# Patient Record
Sex: MALE | URBAN - METROPOLITAN AREA
[De-identification: names, ages, dates, MRNs, and addresses within clinical notes are randomized per-mention and may not be internally consistent; named-entity substitution may affect disease eponyms.]

---

## 2021-12-17 ENCOUNTER — OFFICE VISIT (OUTPATIENT)
Dept: URBAN - METROPOLITAN AREA CLINIC 46 | Facility: CLINIC | Age: 26
End: 2021-12-17

## 2021-12-17 DIAGNOSIS — Z96.1 PRESENCE OF INTRAOCULAR LENS: ICD-10-CM

## 2021-12-17 DIAGNOSIS — H18.421 BAND KERATOPATHY, RIGHT EYE: ICD-10-CM

## 2021-12-17 DIAGNOSIS — H25.11 AGE-RELATED NUCLEAR CATARACT, RIGHT EYE: ICD-10-CM

## 2021-12-17 DIAGNOSIS — H40.1133 PRIMARY OPEN-ANGLE GLAUCOMA, SEVERE STAGE, BILATERAL: Primary | ICD-10-CM

## 2021-12-17 PROCEDURE — 99213 OFFICE O/P EST LOW 20 MIN: CPT | Performed by: OPHTHALMOLOGY

## 2021-12-17 ASSESSMENT — INTRAOCULAR PRESSURE
OS: 19
OD: 8

## 2021-12-17 NOTE — IMPRESSION/PLAN
Impression: Primary open-angle glaucoma, severe stage, bilateral: H40.1133. Plan: Patient's intraocular pressure is within acceptable range and examination is unchanged. Continue current treatment krytan and Lumigan 3-4 daily left eye only.

## 2021-12-17 NOTE — IMPRESSION/PLAN
Impression: Age-related nuclear cataract, right eye: H25.11. Plan: Patient advised there is a cataract, but that visual functioning is good, and cataract surgery is not required at this time. Further advised there is no specific urgency for cataract surgery. They were also advised that having cataract surgery does not mean they will not need to use glasses or contact lenses. We will continue to monitor and they are advised to call or return if any new symptoms.

## 2022-06-15 ENCOUNTER — OFFICE VISIT (OUTPATIENT)
Dept: URBAN - METROPOLITAN AREA CLINIC 50 | Facility: CLINIC | Age: 27
End: 2022-06-15

## 2022-06-15 DIAGNOSIS — H18.421 BAND KERATOPATHY, RIGHT EYE: ICD-10-CM

## 2022-06-15 DIAGNOSIS — H25.11 AGE-RELATED NUCLEAR CATARACT, RIGHT EYE: ICD-10-CM

## 2022-06-15 DIAGNOSIS — H40.1133 PRIMARY OPEN-ANGLE GLAUCOMA, SEVERE STAGE, BILATERAL: Primary | ICD-10-CM

## 2022-06-15 DIAGNOSIS — Q15.0 CONGENITAL GLAUCOMA: ICD-10-CM

## 2022-06-15 PROCEDURE — 99212 OFFICE O/P EST SF 10 MIN: CPT | Performed by: OPHTHALMOLOGY

## 2022-06-15 ASSESSMENT — INTRAOCULAR PRESSURE
OS: 16
OD: 10

## 2022-09-21 ENCOUNTER — OFFICE VISIT (OUTPATIENT)
Dept: URBAN - METROPOLITAN AREA CLINIC 50 | Facility: CLINIC | Age: 27
End: 2022-09-21

## 2022-09-21 DIAGNOSIS — H40.1133 PRIMARY OPEN-ANGLE GLAUCOMA, SEVERE STAGE, BILATERAL: Primary | ICD-10-CM

## 2022-09-21 DIAGNOSIS — H18.421 BAND KERATOPATHY, RIGHT EYE: ICD-10-CM

## 2022-09-21 PROCEDURE — 99212 OFFICE O/P EST SF 10 MIN: CPT | Performed by: OPHTHALMOLOGY

## 2022-09-21 ASSESSMENT — INTRAOCULAR PRESSURE
OS: 9
OS: 21
OS: 17
OD: 4
OD: 6

## 2022-09-21 NOTE — IMPRESSION/PLAN
Impression: Primary open-angle glaucoma, severe stage, bilateral: H40.1133. Plan: Patient's intraocular pressure is within acceptable range and examination is unchanged. Continue current treatment krytan and Lumigan twice daily left eye only, also continue Diamox 250mg BID PO.

## 2022-12-01 ENCOUNTER — OFFICE VISIT (OUTPATIENT)
Dept: URBAN - METROPOLITAN AREA CLINIC 46 | Facility: CLINIC | Age: 27
End: 2022-12-01

## 2022-12-01 DIAGNOSIS — H40.1133 PRIMARY OPEN-ANGLE GLAUCOMA, SEVERE STAGE, BILATERAL: Primary | ICD-10-CM

## 2022-12-01 DIAGNOSIS — Z96.1 PRESENCE OF INTRAOCULAR LENS: ICD-10-CM

## 2022-12-01 DIAGNOSIS — H18.421 BAND KERATOPATHY, RIGHT EYE: ICD-10-CM

## 2022-12-01 PROCEDURE — 92133 CPTRZD OPH DX IMG PST SGM ON: CPT | Performed by: OPHTHALMOLOGY

## 2022-12-01 PROCEDURE — 99213 OFFICE O/P EST LOW 20 MIN: CPT | Performed by: OPHTHALMOLOGY

## 2022-12-01 PROCEDURE — 92083 EXTENDED VISUAL FIELD XM: CPT | Performed by: OPHTHALMOLOGY

## 2022-12-01 ASSESSMENT — INTRAOCULAR PRESSURE
OS: 18
OS: 24
OD: 8
OD: 3

## 2022-12-01 NOTE — IMPRESSION/PLAN
Impression: Primary open-angle glaucoma, severe stage, bilateral: H40.1133. Plan: Patient's intraocular pressure is within acceptable range and examination is unchanged. Continue current treatment krytan and Lumigan twice daily left eye Change Diamox 250mg PO three times a day. 

Begin rhopressa

## 2023-02-20 ENCOUNTER — OFFICE VISIT (OUTPATIENT)
Dept: URBAN - METROPOLITAN AREA CLINIC 50 | Facility: CLINIC | Age: 28
End: 2023-02-20

## 2023-02-20 DIAGNOSIS — H18.421 BAND KERATOPATHY, RIGHT EYE: Primary | ICD-10-CM

## 2023-02-20 DIAGNOSIS — H40.1133 PRIMARY OPEN-ANGLE GLAUCOMA, SEVERE STAGE, BILATERAL: ICD-10-CM

## 2023-02-20 PROCEDURE — 99212 OFFICE O/P EST SF 10 MIN: CPT | Performed by: OPHTHALMOLOGY

## 2023-02-20 ASSESSMENT — INTRAOCULAR PRESSURE
OD: 8
OS: 20
OS: 23

## 2023-02-20 NOTE — IMPRESSION/PLAN
Impression: Primary open-angle glaucoma, severe stage, bilateral: H40.1133. Plan: Patient's intraocular pressure is within acceptable range and examination is unchanged. Continue current treatment krytan and Lumigan twice daily left eye Continue Diamox 250mg PO three times a day. Contiue  rhopressa qhs OS.

## 2023-03-20 ENCOUNTER — OFFICE VISIT (OUTPATIENT)
Dept: URBAN - METROPOLITAN AREA CLINIC 50 | Facility: CLINIC | Age: 28
End: 2023-03-20

## 2023-03-20 DIAGNOSIS — H40.1133 PRIMARY OPEN-ANGLE GLAUCOMA, SEVERE STAGE, BILATERAL: Primary | ICD-10-CM

## 2023-03-20 PROCEDURE — 99213 OFFICE O/P EST LOW 20 MIN: CPT | Performed by: OPHTHALMOLOGY

## 2023-03-20 RX ORDER — BIMATOPROST 0.3 MG/ML
0.03 % SOLUTION/ DROPS OPHTHALMIC
Qty: 2.5 | Refills: 11 | Status: ACTIVE
Start: 2023-03-20

## 2023-03-20 ASSESSMENT — INTRAOCULAR PRESSURE
OS: 18
OD: 10
OD: 12

## 2023-03-20 NOTE — IMPRESSION/PLAN
Impression: Primary open-angle glaucoma, severe stage, bilateral: H40.4943. Plan: Patient's intraocular pressure is within acceptable range and examination is unchanged. Continue current treatment krytan and Lumigan(may use generic twice daily left eye. Continue Diamox 250mg PO three times a day. Contiue  rhopressa qhs OS.

## 2023-05-22 ENCOUNTER — OFFICE VISIT (OUTPATIENT)
Dept: URBAN - METROPOLITAN AREA CLINIC 50 | Facility: CLINIC | Age: 28
End: 2023-05-22

## 2023-05-22 DIAGNOSIS — Z96.1 PRESENCE OF INTRAOCULAR LENS: ICD-10-CM

## 2023-05-22 DIAGNOSIS — H18.421 BAND KERATOPATHY, RIGHT EYE: ICD-10-CM

## 2023-05-22 DIAGNOSIS — H40.1133 PRIMARY OPEN-ANGLE GLAUCOMA, SEVERE STAGE, BILATERAL: Primary | ICD-10-CM

## 2023-05-22 PROCEDURE — 99213 OFFICE O/P EST LOW 20 MIN: CPT | Performed by: OPHTHALMOLOGY

## 2023-05-22 RX ORDER — BIMATOPROST 0.3 MG/ML
0.03 % SOLUTION/ DROPS OPHTHALMIC
Qty: 7.5 | Refills: 11 | Status: ACTIVE
Start: 2023-05-22

## 2023-05-22 RX ORDER — ACETAZOLAMIDE 250 MG/1
250 MG TABLET ORAL
Qty: 180 | Refills: 6 | Status: ACTIVE
Start: 2023-05-22

## 2023-05-22 RX ORDER — ACETAZOLAMIDE 250 MG/1
250 MG TABLET ORAL
Qty: 180 | Refills: 6 | Status: INACTIVE
Start: 2023-05-22 | End: 2023-05-22

## 2023-05-22 ASSESSMENT — INTRAOCULAR PRESSURE
OS: 13
OD: 10

## 2023-05-22 NOTE — IMPRESSION/PLAN
Impression: Primary open-angle glaucoma, severe stage, bilateral: H40.1133. Plan: Patient's intraocular pressure is within acceptable range and examination is unchanged. Continue current treatment krytan and Lumigan(may use generic twice daily left eye. Continue Diamox 250mg PO three times a day. 

may d/c rhopressa

## 2023-08-28 ENCOUNTER — OFFICE VISIT (OUTPATIENT)
Dept: URBAN - METROPOLITAN AREA CLINIC 50 | Facility: CLINIC | Age: 28
End: 2023-08-28
Payer: COMMERCIAL

## 2023-08-28 DIAGNOSIS — H43.392 OTHER VITREOUS OPACITIES, LEFT EYE: ICD-10-CM

## 2023-08-28 DIAGNOSIS — H43.12 VITREOUS HEMORRHAGE, LEFT EYE: Primary | ICD-10-CM

## 2023-08-28 DIAGNOSIS — H40.1133 PRIMARY OPEN-ANGLE GLAUCOMA, SEVERE STAGE, BILATERAL: ICD-10-CM

## 2023-08-28 PROCEDURE — 99214 OFFICE O/P EST MOD 30 MIN: CPT | Performed by: OPHTHALMOLOGY

## 2023-08-28 ASSESSMENT — INTRAOCULAR PRESSURE
OS: 22
OD: 12

## 2023-08-31 ENCOUNTER — OFFICE VISIT (OUTPATIENT)
Dept: URBAN - METROPOLITAN AREA CLINIC 7 | Facility: CLINIC | Age: 28
End: 2023-08-31

## 2023-08-31 DIAGNOSIS — Z96.1 PRESENCE OF INTRAOCULAR LENS: ICD-10-CM

## 2023-08-31 PROCEDURE — 76512 OPH US DX B-SCAN: CPT | Performed by: STUDENT IN AN ORGANIZED HEALTH CARE EDUCATION/TRAINING PROGRAM

## 2023-08-31 PROCEDURE — 99203 OFFICE O/P NEW LOW 30 MIN: CPT | Performed by: STUDENT IN AN ORGANIZED HEALTH CARE EDUCATION/TRAINING PROGRAM

## 2023-08-31 PROCEDURE — 92134 CPTRZ OPH DX IMG PST SGM RTA: CPT | Performed by: STUDENT IN AN ORGANIZED HEALTH CARE EDUCATION/TRAINING PROGRAM

## 2023-08-31 ASSESSMENT — INTRAOCULAR PRESSURE
OS: 33
OD: 15

## 2023-09-11 ENCOUNTER — OFFICE VISIT (OUTPATIENT)
Dept: URBAN - METROPOLITAN AREA CLINIC 50 | Facility: CLINIC | Age: 28
End: 2023-09-11

## 2023-09-11 DIAGNOSIS — H43.392 OTHER VITREOUS OPACITIES, LEFT EYE: ICD-10-CM

## 2023-09-11 DIAGNOSIS — H40.1133 PRIMARY OPEN-ANGLE GLAUCOMA, SEVERE STAGE, BILATERAL: Primary | ICD-10-CM

## 2023-09-11 PROCEDURE — 99212 OFFICE O/P EST SF 10 MIN: CPT | Performed by: OPHTHALMOLOGY

## 2023-09-11 RX ORDER — ACETAZOLAMIDE 250 MG/1
250 MG TABLET ORAL
Qty: 180 | Refills: 6 | Status: ACTIVE
Start: 2023-09-11

## 2023-09-11 RX ORDER — DORZOLAMIDE HYDROCHLORIDE AND TIMOLOL MALEATE 20; 5 MG/ML; MG/ML
SOLUTION/ DROPS OPHTHALMIC
Qty: 0 | Refills: 0 | Status: ACTIVE
Start: 2023-09-11

## 2023-09-11 ASSESSMENT — INTRAOCULAR PRESSURE
OS: 21
OS: 28
OD: 9

## 2023-09-13 ENCOUNTER — OFFICE VISIT (OUTPATIENT)
Dept: URBAN - METROPOLITAN AREA CLINIC 41 | Facility: CLINIC | Age: 28
End: 2023-09-13

## 2023-09-13 DIAGNOSIS — Z96.1 PRESENCE OF INTRAOCULAR LENS: ICD-10-CM

## 2023-09-13 DIAGNOSIS — H43.12 VITREOUS HEMORRHAGE, LEFT EYE: ICD-10-CM

## 2023-09-13 PROCEDURE — 92201 OPSCPY EXTND RTA DRAW UNI/BI: CPT | Performed by: STUDENT IN AN ORGANIZED HEALTH CARE EDUCATION/TRAINING PROGRAM

## 2023-09-13 PROCEDURE — 99214 OFFICE O/P EST MOD 30 MIN: CPT | Performed by: STUDENT IN AN ORGANIZED HEALTH CARE EDUCATION/TRAINING PROGRAM

## 2023-09-13 PROCEDURE — 92134 CPTRZ OPH DX IMG PST SGM RTA: CPT | Performed by: STUDENT IN AN ORGANIZED HEALTH CARE EDUCATION/TRAINING PROGRAM

## 2023-09-13 ASSESSMENT — INTRAOCULAR PRESSURE
OS: 25
OD: 7

## 2023-10-16 ENCOUNTER — OFFICE VISIT (OUTPATIENT)
Dept: URBAN - METROPOLITAN AREA CLINIC 50 | Facility: CLINIC | Age: 28
End: 2023-10-16

## 2023-10-16 DIAGNOSIS — H40.1133 PRIMARY OPEN-ANGLE GLAUCOMA, SEVERE STAGE, BILATERAL: Primary | ICD-10-CM

## 2023-10-16 DIAGNOSIS — H43.12 VITREOUS HEMORRHAGE, LEFT EYE: ICD-10-CM

## 2023-10-16 PROCEDURE — 99213 OFFICE O/P EST LOW 20 MIN: CPT | Performed by: OPHTHALMOLOGY

## 2023-10-16 RX ORDER — PREDNISOLONE ACETATE 10 MG/ML
1 % SUSPENSION/ DROPS OPHTHALMIC
Qty: 15 | Refills: 0 | Status: ACTIVE
Start: 2023-10-16

## 2023-10-16 ASSESSMENT — INTRAOCULAR PRESSURE
OS: 23
OD: 9
OS: 29
OS: 22

## 2023-10-26 ENCOUNTER — PROCEDURE (OUTPATIENT)
Dept: URBAN - METROPOLITAN AREA SURGERY 25 | Facility: SURGERY | Age: 28
End: 2023-10-26

## 2023-10-26 PROCEDURE — 66710 CILIARY TRANSSLERAL THERAPY: CPT | Performed by: OPHTHALMOLOGY

## 2023-10-27 ENCOUNTER — POST-OPERATIVE VISIT (OUTPATIENT)
Dept: URBAN - METROPOLITAN AREA CLINIC 46 | Facility: CLINIC | Age: 28
End: 2023-10-27

## 2023-10-27 DIAGNOSIS — Z48.810 ENCOUNTER FOR SURGICAL AFTERCARE FOLLOWING SURGERY ON A SENSE ORGAN: Primary | ICD-10-CM

## 2023-10-27 PROCEDURE — 99024 POSTOP FOLLOW-UP VISIT: CPT | Performed by: OPHTHALMOLOGY

## 2023-10-27 ASSESSMENT — INTRAOCULAR PRESSURE
OS: 45
OS: 27
OS: 42

## 2023-11-27 ENCOUNTER — OFFICE VISIT (OUTPATIENT)
Dept: URBAN - METROPOLITAN AREA CLINIC 50 | Facility: CLINIC | Age: 28
End: 2023-11-27

## 2023-11-27 DIAGNOSIS — H40.1133 PRIMARY OPEN-ANGLE GLAUCOMA, SEVERE STAGE, BILATERAL: Primary | ICD-10-CM

## 2023-11-27 DIAGNOSIS — H43.12 VITREOUS HEMORRHAGE, LEFT EYE: ICD-10-CM

## 2023-11-27 PROCEDURE — 99212 OFFICE O/P EST SF 10 MIN: CPT | Performed by: OPHTHALMOLOGY

## 2023-11-27 RX ORDER — ACETAZOLAMIDE 250 MG/1
250 MG TABLET ORAL
Qty: 180 | Refills: 6 | Status: ACTIVE
Start: 2023-11-27

## 2023-11-27 ASSESSMENT — INTRAOCULAR PRESSURE
OS: 12
OD: 9
OS: 10

## 2024-03-04 ENCOUNTER — OFFICE VISIT (OUTPATIENT)
Dept: URBAN - METROPOLITAN AREA CLINIC 50 | Facility: CLINIC | Age: 29
End: 2024-03-04

## 2024-03-04 DIAGNOSIS — H40.1133 PRIMARY OPEN-ANGLE GLAUCOMA, SEVERE STAGE, BILATERAL: Primary | ICD-10-CM

## 2024-03-04 PROCEDURE — 99213 OFFICE O/P EST LOW 20 MIN: CPT | Performed by: OPHTHALMOLOGY

## 2024-03-04 ASSESSMENT — INTRAOCULAR PRESSURE
OD: 6
OS: 10
OS: 17

## 2024-06-12 ENCOUNTER — OFFICE VISIT (OUTPATIENT)
Dept: URBAN - METROPOLITAN AREA CLINIC 50 | Facility: CLINIC | Age: 29
End: 2024-06-12

## 2024-06-12 DIAGNOSIS — Z96.1 PRESENCE OF INTRAOCULAR LENS: ICD-10-CM

## 2024-06-12 DIAGNOSIS — H40.1133 PRIMARY OPEN-ANGLE GLAUCOMA, SEVERE STAGE, BILATERAL: Primary | ICD-10-CM

## 2024-06-12 DIAGNOSIS — H18.421 BAND KERATOPATHY, RIGHT EYE: ICD-10-CM

## 2024-06-12 PROCEDURE — 99212 OFFICE O/P EST SF 10 MIN: CPT | Performed by: OPHTHALMOLOGY

## 2024-06-12 ASSESSMENT — INTRAOCULAR PRESSURE
OS: 12
OD: 9
OS: 16

## 2024-08-21 ENCOUNTER — OFFICE VISIT (OUTPATIENT)
Dept: URBAN - METROPOLITAN AREA CLINIC 50 | Facility: CLINIC | Age: 29
End: 2024-08-21

## 2024-08-21 DIAGNOSIS — H40.1133 PRIMARY OPEN-ANGLE GLAUCOMA, SEVERE STAGE, BILATERAL: Primary | ICD-10-CM

## 2024-08-21 PROCEDURE — 99212 OFFICE O/P EST SF 10 MIN: CPT | Performed by: OPHTHALMOLOGY

## 2024-08-21 RX ORDER — DORZOLAMIDE HYDROCHLORIDE AND TIMOLOL MALEATE 20; 5 MG/ML; MG/ML
SOLUTION/ DROPS OPHTHALMIC
Qty: 30 | Refills: 3 | Status: ACTIVE
Start: 2024-08-21

## 2024-08-21 ASSESSMENT — INTRAOCULAR PRESSURE
OS: 17
OS: 12
OD: 17

## 2024-11-13 ENCOUNTER — OFFICE VISIT (OUTPATIENT)
Dept: URBAN - METROPOLITAN AREA CLINIC 50 | Facility: CLINIC | Age: 29
End: 2024-11-13

## 2024-11-13 DIAGNOSIS — H18.421 BAND KERATOPATHY, RIGHT EYE: ICD-10-CM

## 2024-11-13 DIAGNOSIS — H40.1133 PRIMARY OPEN-ANGLE GLAUCOMA, SEVERE STAGE, BILATERAL: Primary | ICD-10-CM

## 2024-11-13 DIAGNOSIS — Z96.1 PRESENCE OF INTRAOCULAR LENS: ICD-10-CM

## 2024-11-13 PROCEDURE — 99213 OFFICE O/P EST LOW 20 MIN: CPT | Performed by: OPHTHALMOLOGY

## 2024-11-13 ASSESSMENT — INTRAOCULAR PRESSURE
OS: 14
OS: 10

## 2025-02-12 ENCOUNTER — OFFICE VISIT (OUTPATIENT)
Dept: URBAN - METROPOLITAN AREA CLINIC 50 | Facility: CLINIC | Age: 30
End: 2025-02-12

## 2025-02-12 DIAGNOSIS — H40.1133 PRIMARY OPEN-ANGLE GLAUCOMA, SEVERE STAGE, BILATERAL: Primary | ICD-10-CM

## 2025-02-12 DIAGNOSIS — Z96.1 PRESENCE OF INTRAOCULAR LENS: ICD-10-CM

## 2025-02-12 DIAGNOSIS — H18.421 BAND KERATOPATHY, RIGHT EYE: ICD-10-CM

## 2025-02-12 PROCEDURE — 99211 OFF/OP EST MAY X REQ PHY/QHP: CPT | Performed by: OPHTHALMOLOGY

## 2025-02-12 ASSESSMENT — INTRAOCULAR PRESSURE
OD: 10
OS: 11

## 2025-05-14 ENCOUNTER — OFFICE VISIT (OUTPATIENT)
Dept: URBAN - METROPOLITAN AREA CLINIC 50 | Facility: CLINIC | Age: 30
End: 2025-05-14
Payer: COMMERCIAL

## 2025-05-14 DIAGNOSIS — H40.1133 PRIMARY OPEN-ANGLE GLAUCOMA, SEVERE STAGE, BILATERAL: Primary | ICD-10-CM

## 2025-05-14 DIAGNOSIS — H18.421 BAND KERATOPATHY, RIGHT EYE: ICD-10-CM

## 2025-05-14 DIAGNOSIS — Z96.1 PRESENCE OF INTRAOCULAR LENS: ICD-10-CM

## 2025-05-14 PROCEDURE — 99213 OFFICE O/P EST LOW 20 MIN: CPT | Performed by: OPHTHALMOLOGY

## 2025-05-14 ASSESSMENT — INTRAOCULAR PRESSURE
OS: 8
OD: 7
OS: 10
OD: 8

## 2025-08-07 ENCOUNTER — OFFICE VISIT (OUTPATIENT)
Dept: URBAN - METROPOLITAN AREA CLINIC 46 | Facility: CLINIC | Age: 30
End: 2025-08-07
Payer: COMMERCIAL

## 2025-08-07 DIAGNOSIS — H18.421 BAND KERATOPATHY, RIGHT EYE: ICD-10-CM

## 2025-08-07 DIAGNOSIS — H40.1133 PRIMARY OPEN-ANGLE GLAUCOMA, SEVERE STAGE, BILATERAL: Primary | ICD-10-CM

## 2025-08-07 DIAGNOSIS — Z96.1 PRESENCE OF INTRAOCULAR LENS: ICD-10-CM

## 2025-08-07 PROCEDURE — 99212 OFFICE O/P EST SF 10 MIN: CPT | Performed by: OPHTHALMOLOGY

## 2025-08-07 ASSESSMENT — INTRAOCULAR PRESSURE
OS: 11
OD: 8